# Patient Record
Sex: MALE | Race: WHITE | NOT HISPANIC OR LATINO | Employment: OTHER | URBAN - METROPOLITAN AREA
[De-identification: names, ages, dates, MRNs, and addresses within clinical notes are randomized per-mention and may not be internally consistent; named-entity substitution may affect disease eponyms.]

---

## 2017-02-22 ENCOUNTER — TRANSFERRED RECORDS (OUTPATIENT)
Dept: HEALTH INFORMATION MANAGEMENT | Facility: CLINIC | Age: 82
End: 2017-02-22

## 2017-02-28 ENCOUNTER — TRANSFERRED RECORDS (OUTPATIENT)
Dept: HEALTH INFORMATION MANAGEMENT | Facility: CLINIC | Age: 82
End: 2017-02-28

## 2017-03-06 ENCOUNTER — TRANSFERRED RECORDS (OUTPATIENT)
Dept: HEALTH INFORMATION MANAGEMENT | Facility: CLINIC | Age: 82
End: 2017-03-06

## 2017-03-08 ENCOUNTER — TRANSFERRED RECORDS (OUTPATIENT)
Dept: HEALTH INFORMATION MANAGEMENT | Facility: CLINIC | Age: 82
End: 2017-03-08

## 2017-03-16 ENCOUNTER — PRE VISIT (OUTPATIENT)
Dept: UROLOGY | Facility: CLINIC | Age: 82
End: 2017-03-16

## 2017-03-16 ENCOUNTER — SURGERY (OUTPATIENT)
Age: 82
End: 2017-03-16

## 2017-03-16 ENCOUNTER — INFUSION THERAPY VISIT (OUTPATIENT)
Dept: INFUSION THERAPY | Facility: CLINIC | Age: 82
End: 2017-03-16
Attending: INTERNAL MEDICINE
Payer: COMMERCIAL

## 2017-03-16 ENCOUNTER — HOSPITAL ENCOUNTER (OUTPATIENT)
Facility: CLINIC | Age: 82
Discharge: HOME | End: 2017-03-16
Attending: COLON & RECTAL SURGERY | Admitting: COLON & RECTAL SURGERY
Payer: COMMERCIAL

## 2017-03-16 VITALS — DIASTOLIC BLOOD PRESSURE: 73 MMHG | TEMPERATURE: 97.6 F | SYSTOLIC BLOOD PRESSURE: 116 MMHG

## 2017-03-16 VITALS
TEMPERATURE: 97.5 F | DIASTOLIC BLOOD PRESSURE: 76 MMHG | RESPIRATION RATE: 16 BRPM | OXYGEN SATURATION: 99 % | SYSTOLIC BLOOD PRESSURE: 125 MMHG | HEART RATE: 59 BPM

## 2017-03-16 DIAGNOSIS — N39.0 UTI (URINARY TRACT INFECTION): Primary | ICD-10-CM

## 2017-03-16 PROCEDURE — 40000882 ZZH CANCELLED SURGERY UP TO 46-60 MINS: Performed by: COLON & RECTAL SURGERY

## 2017-03-16 PROCEDURE — 27210218 ZZH KIT SHRLOCK 4FR SNGL LUM PICC

## 2017-03-16 PROCEDURE — 25000125 ZZHC RX 250: Performed by: INTERNAL MEDICINE

## 2017-03-16 PROCEDURE — 40000257 ZZH STATISTIC CONSULT NO CHARGE VASC ACCESS

## 2017-03-16 PROCEDURE — 96365 THER/PROPH/DIAG IV INF INIT: CPT

## 2017-03-16 PROCEDURE — 25000128 H RX IP 250 OP 636: Performed by: INTERNAL MEDICINE

## 2017-03-16 PROCEDURE — 36569 INSJ PICC 5 YR+ W/O IMAGING: CPT

## 2017-03-16 RX ADMIN — LIDOCAINE HYDROCHLORIDE 2 ML: 10 INJECTION, SOLUTION INFILTRATION; PERINEURAL at 13:17

## 2017-03-16 RX ADMIN — CEFTAZIDIME 2 G: 2 INJECTION, POWDER, FOR SOLUTION INTRAVENOUS at 14:47

## 2017-03-16 ASSESSMENT — PAIN SCALES - GENERAL: PAINLEVEL: NO PAIN (0)

## 2017-03-16 NOTE — PROGRESS NOTES
Infusion Nursing Note:  Paulo Jeffers presents today for Fortaz.    Patient seen by provider today: No   present during visit today: Not Applicable.    Note: Pt here for first dose of Fortaz and then [facility]      Intravenous Access:  PICC.    Treatment Conditions:  Not Applicable.      Post Infusion Assessment:  Patient tolerated infusion without incident.  Blood return noted pre and post infusion.  Site patent and intact, free from redness, edema or discomfort.  No evidence of extravasations.    Discharge Plan:   AVS to patient via MYCHART.    Patient discharged in stable condition accompanied by: self and wife.  Departure Mode: Ambulatory.    aRdha Velazquez RN

## 2017-03-16 NOTE — MR AVS SNAPSHOT
After Visit Summary   3/16/2017    Paulo Jeffers    MRN: [id number]           Patient Information     Date Of Birth          1935        Visit Information        Provider Department      3/16/2017 2:30 PM  INFUSION CHAIR 7 [facility]        Today's Diagnoses     UTI (urinary tract infection)    -  1       Follow-ups after your visit        Your next 10 appointments already scheduled     Apr 19, 2017  1:20 PM CDT   (Arrive by 1:05 PM)   New Patient Visit with Regina Cash MD   [facility] Prostate and Urologic Cancers ([facility] [employer])    [address] [place] [address]  4th [facility] [address] [phone]              Future tests that were ordered for you today     Open Standing Orders        Priority Remaining Interval Expires Ordered    XR Chest Port 1 View STAT 2/2 CONDITIONAL X 2  3/16/2017            Who to contact     If you have questions or need follow up information about today's clinic visit or your schedule please contact [facility] directly at [phone].  Normal or non-critical lab and imaging results will be communicated to you by [employer], letter or phone within 4 business days after the clinic has received the results. If you do not hear from us within 7 days, please contact the clinic through [employer] or phone. If you have a critical or abnormal lab result, we will notify you by phone as soon as possible.  Submit refill requests through [employer] or call your pharmacy and they will forward the refill request to us. Please allow 3 business days for your refill to be completed.          Additional Information About Your Visit        MyChart Information     [employer] gives you secure access to your electronic health record. If you see a primary care provider, you can also send messages to your care team and make appointments. If you have questions, please call your  primary care clinic.  If you do not have a primary care provider, please call [phone] and they will assist you.        Care EveryWhere ID     This is your Care EveryWhere ID. This could be used by other organizations to access your [place] medical records  [id number]        Your Vitals Were     Pulse Temperature Respirations Pulse Oximetry          59 97.5  F (36.4  C) (Oral) 16 99%         Blood Pressure from Last 3 Encounters:   03/16/17 125/76   03/16/17 116/73   09/21/12 138/96    Weight from Last 3 Encounters:   09/21/12 93 kg (205 lb)   06/30/12 93.9 kg (207 lb)   06/20/12 95.9 kg (211 lb 6.7 oz)              Today, you had the following     No orders found for display         Today's Medication Changes      Notice     This visit is during an admission. Changes to the med list made in this visit will be reflected in the After Visit Summary of the admission.             Primary Care Provider Office Phone # Fax #    Denny Roque [phone]       [place] [address]        Thank you!     Thank you for choosing [facility]  for your care. Our goal is always to provide you with excellent care. Hearing back from our patients is one way we can continue to improve our services. Please take a few minutes to complete the written survey that you may receive in the mail after your visit with us. Thank you!             Your Updated Medication List - Protect others around you: Learn how to safely use, store and throw away your medicines at www.disposemymeds.org.      Notice     This visit is during an admission. Changes to the med list made in this visit will be reflected in the After Visit Summary of the admission.

## 2017-03-16 NOTE — PROGRESS NOTES
PICC teaching regarding line, care, and possible complication completed by Sonia Oliveira RN. Consent obtained.

## 2017-03-20 LAB
ALBUMIN UR-MCNC: NEGATIVE MG/DL
APPEARANCE UR: CLEAR
AST SERPL W P-5'-P-CCNC: 25 U/L (ref 0–45)
BASOPHILS # BLD AUTO: 0.1 10E9/L (ref 0–0.2)
BASOPHILS NFR BLD AUTO: 0.5 %
BILIRUB UR QL STRIP: NEGATIVE
CK SERPL-CCNC: 99 U/L (ref 30–300)
COLOR UR AUTO: NORMAL
DIFFERENTIAL METHOD BLD: ABNORMAL
EOSINOPHIL # BLD AUTO: 0.2 10E9/L (ref 0–0.7)
EOSINOPHIL NFR BLD AUTO: 2.3 %
ERYTHROCYTE [DISTWIDTH] IN BLOOD BY AUTOMATED COUNT: 12.1 % (ref 10–15)
GLUCOSE UR STRIP-MCNC: NEGATIVE MG/DL
HCT VFR BLD AUTO: 33 % (ref 40–53)
HGB BLD-MCNC: 11.4 G/DL (ref 13.3–17.7)
HGB UR QL STRIP: NEGATIVE
IMM GRANULOCYTES # BLD: 0.1 10E9/L (ref 0–0.4)
IMM GRANULOCYTES NFR BLD: 1 %
KETONES UR STRIP-MCNC: NEGATIVE MG/DL
LEUKOCYTE ESTERASE UR QL STRIP: NEGATIVE
LYMPHOCYTES # BLD AUTO: 1.2 10E9/L (ref 0.8–5.3)
LYMPHOCYTES NFR BLD AUTO: 12 %
MCH RBC QN AUTO: 32.4 PG (ref 26.5–33)
MCHC RBC AUTO-ENTMCNC: 34.5 G/DL (ref 31.5–36.5)
MCV RBC AUTO: 94 FL (ref 78–100)
MONOCYTES # BLD AUTO: 0.5 10E9/L (ref 0–1.3)
MONOCYTES NFR BLD AUTO: 5 %
NEUTROPHILS # BLD AUTO: 7.7 10E9/L (ref 1.6–8.3)
NEUTROPHILS NFR BLD AUTO: 79.2 %
NITRATE UR QL: NEGATIVE
NRBC # BLD AUTO: 0 10*3/UL
NRBC BLD AUTO-RTO: 0 /100
PH UR STRIP: 6.5 PH (ref 5–7)
PLATELET # BLD AUTO: 270 10E9/L (ref 150–450)
RBC # BLD AUTO: 3.52 10E12/L (ref 4.4–5.9)
RBC #/AREA URNS AUTO: 0 /HPF (ref 0–2)
SP GR UR STRIP: 1 (ref 1–1.03)
URN SPEC COLLECT METH UR: NORMAL
UROBILINOGEN UR STRIP-MCNC: NORMAL MG/DL (ref 0–2)
WBC # BLD AUTO: 9.7 10E9/L (ref 4–11)
WBC #/AREA URNS AUTO: 1 /HPF (ref 0–2)

## 2017-03-20 PROCEDURE — 82550 ASSAY OF CK (CPK): CPT | Performed by: INTERNAL MEDICINE

## 2017-03-20 PROCEDURE — 84450 TRANSFERASE (AST) (SGOT): CPT | Performed by: INTERNAL MEDICINE

## 2017-03-20 PROCEDURE — 85025 COMPLETE CBC W/AUTO DIFF WBC: CPT | Performed by: INTERNAL MEDICINE

## 2017-03-20 PROCEDURE — 82565 ASSAY OF CREATININE: CPT | Performed by: INTERNAL MEDICINE

## 2017-03-20 PROCEDURE — 87086 URINE CULTURE/COLONY COUNT: CPT | Performed by: INTERNAL MEDICINE

## 2017-03-20 PROCEDURE — 81001 URINALYSIS AUTO W/SCOPE: CPT | Performed by: INTERNAL MEDICINE

## 2017-03-21 ENCOUNTER — HOSPITAL ENCOUNTER (OUTPATIENT)
Facility: CLINIC | Age: 82
Discharge: HOME | End: 2017-03-21
Attending: PATHOLOGY | Admitting: PATHOLOGY
Payer: COMMERCIAL

## 2017-03-21 ENCOUNTER — SURGERY (OUTPATIENT)
Age: 82
End: 2017-03-21

## 2017-03-21 VITALS
RESPIRATION RATE: 30 BRPM | DIASTOLIC BLOOD PRESSURE: 82 MMHG | OXYGEN SATURATION: 96 % | SYSTOLIC BLOOD PRESSURE: 143 MMHG

## 2017-03-21 LAB
BACTERIA SPEC CULT: NO GROWTH
BASOPHILS # BLD AUTO: 0 10E9/L (ref 0–0.2)
BASOPHILS NFR BLD AUTO: 0.5 %
DIFFERENTIAL METHOD BLD: ABNORMAL
EOSINOPHIL # BLD AUTO: 0.3 10E9/L (ref 0–0.7)
EOSINOPHIL NFR BLD AUTO: 4.4 %
ERYTHROCYTE [DISTWIDTH] IN BLOOD BY AUTOMATED COUNT: 12.1 % (ref 10–15)
HCT VFR BLD AUTO: 31.4 % (ref 40–53)
HGB BLD-MCNC: 11.4 G/DL (ref 13.3–17.7)
IMM GRANULOCYTES # BLD: 0.1 10E9/L (ref 0–0.4)
IMM GRANULOCYTES NFR BLD: 1.8 %
LYMPHOCYTES # BLD AUTO: 1.1 10E9/L (ref 0.8–5.3)
LYMPHOCYTES NFR BLD AUTO: 18 %
Lab: NORMAL
MCH RBC QN AUTO: 33.7 PG (ref 26.5–33)
MCHC RBC AUTO-ENTMCNC: 36.3 G/DL (ref 31.5–36.5)
MCV RBC AUTO: 93 FL (ref 78–100)
MICRO REPORT STATUS: NORMAL
MONOCYTES # BLD AUTO: 0.5 10E9/L (ref 0–1.3)
MONOCYTES NFR BLD AUTO: 8 %
NEUTROPHILS # BLD AUTO: 4.1 10E9/L (ref 1.6–8.3)
NEUTROPHILS NFR BLD AUTO: 67.3 %
NRBC # BLD AUTO: 0 10*3/UL
NRBC BLD AUTO-RTO: 0 /100
PLATELET # BLD AUTO: 206 10E9/L (ref 150–450)
RBC # BLD AUTO: 3.38 10E12/L (ref 4.4–5.9)
RETICS # AUTO: 49.3 10E9/L (ref 25–95)
RETICS/RBC NFR AUTO: 1.5 % (ref 0.5–2)
SPECIMEN SOURCE: NORMAL
WBC # BLD AUTO: 6.1 10E9/L (ref 4–11)

## 2017-03-21 PROCEDURE — 40000795 ZZHCL STATISTIC DNA PROCESS AND HOLD: Performed by: PATHOLOGY

## 2017-03-21 PROCEDURE — 88313 SPECIAL STAINS GROUP 2: CPT | Mod: 26 | Performed by: INTERNAL MEDICINE

## 2017-03-21 PROCEDURE — 40001005 ZZHCL STATISTIC FLOW >15 ABY TC 88189: Performed by: INTERNAL MEDICINE

## 2017-03-21 PROCEDURE — 40000847 ZZHCL STATISTIC MORPHOLOGY W/INTERP HISTOLOGY TC 85060: Performed by: INTERNAL MEDICINE

## 2017-03-21 PROCEDURE — 25000128 H RX IP 250 OP 636: Performed by: PATHOLOGY

## 2017-03-21 PROCEDURE — 88313 SPECIAL STAINS GROUP 2: CPT | Performed by: INTERNAL MEDICINE

## 2017-03-21 PROCEDURE — 38221 DX BONE MARROW BIOPSIES: CPT | Performed by: INTERNAL MEDICINE

## 2017-03-21 PROCEDURE — 88311 DECALCIFY TISSUE: CPT | Performed by: INTERNAL MEDICINE

## 2017-03-21 PROCEDURE — 85045 AUTOMATED RETICULOCYTE COUNT: CPT | Performed by: PATHOLOGY

## 2017-03-21 PROCEDURE — 88264 CHROMOSOME ANALYSIS 20-25: CPT | Performed by: INTERNAL MEDICINE

## 2017-03-21 PROCEDURE — 88305 TISSUE EXAM BY PATHOLOGIST: CPT | Performed by: INTERNAL MEDICINE

## 2017-03-21 PROCEDURE — 88184 FLOWCYTOMETRY/ TC 1 MARKER: CPT | Performed by: INTERNAL MEDICINE

## 2017-03-21 PROCEDURE — 85060 BLOOD SMEAR INTERPRETATION: CPT | Performed by: INTERNAL MEDICINE

## 2017-03-21 PROCEDURE — 88182 CELL MARKER STUDY: CPT | Performed by: INTERNAL MEDICINE

## 2017-03-21 PROCEDURE — 88185 FLOWCYTOMETRY/TC ADD-ON: CPT | Performed by: INTERNAL MEDICINE

## 2017-03-21 PROCEDURE — 00000159 ZZHCL STATISTIC H-SEND OUTS PREP: Performed by: INTERNAL MEDICINE

## 2017-03-21 PROCEDURE — 40000424 ZZHCL STATISTIC BONE MARROW CORE PERF TC 38221: Performed by: INTERNAL MEDICINE

## 2017-03-21 PROCEDURE — 99152 MOD SED SAME PHYS/QHP 5/>YRS: CPT | Performed by: PATHOLOGY

## 2017-03-21 PROCEDURE — 88237 TISSUE CULTURE BONE MARROW: CPT | Performed by: INTERNAL MEDICINE

## 2017-03-21 PROCEDURE — 85025 COMPLETE CBC W/AUTO DIFF WBC: CPT | Performed by: PATHOLOGY

## 2017-03-21 PROCEDURE — 88305 TISSUE EXAM BY PATHOLOGIST: CPT | Mod: 26 | Performed by: INTERNAL MEDICINE

## 2017-03-21 PROCEDURE — 40000948 ZZHCL STATISTIC BONE MARROW ASP TC 85097: Performed by: INTERNAL MEDICINE

## 2017-03-21 PROCEDURE — G0364 BONE MARROW ASPIRATE &BIOPSY: HCPCS

## 2017-03-21 PROCEDURE — 88311 DECALCIFY TISSUE: CPT | Mod: 26 | Performed by: INTERNAL MEDICINE

## 2017-03-21 PROCEDURE — 00000058 ZZHCL STATISTIC BONE MARROW ASP PERF TC 38220: Performed by: INTERNAL MEDICINE

## 2017-03-21 PROCEDURE — 85097 BONE MARROW INTERPRETATION: CPT | Performed by: INTERNAL MEDICINE

## 2017-03-21 PROCEDURE — 88280 CHROMOSOME KARYOTYPE STUDY: CPT | Performed by: INTERNAL MEDICINE

## 2017-03-21 PROCEDURE — 38221 DX BONE MARROW BIOPSIES: CPT | Performed by: PATHOLOGY

## 2017-03-21 RX ORDER — TIMOLOL MALEATE 5 MG/ML
1 SOLUTION/ DROPS OPHTHALMIC 2 TIMES DAILY
COMMUNITY

## 2017-03-21 RX ORDER — OMEGA-3-ACID ETHYL ESTERS 1 G/1
2 CAPSULE, LIQUID FILLED ORAL 2 TIMES DAILY
COMMUNITY

## 2017-03-21 RX ORDER — CYCLOSPORINE 0.5 MG/ML
1 EMULSION OPHTHALMIC 2 TIMES DAILY
COMMUNITY

## 2017-03-21 RX ORDER — MULTIPLE VITAMINS W/ MINERALS TAB 9MG-400MCG
1 TAB ORAL DAILY
COMMUNITY

## 2017-03-21 RX ORDER — FLUMAZENIL 0.1 MG/ML
0.2 INJECTION, SOLUTION INTRAVENOUS
Status: DISCONTINUED | OUTPATIENT
Start: 2017-03-21 | End: 2017-03-21 | Stop reason: HOSPADM

## 2017-03-21 RX ADMIN — MIDAZOLAM HYDROCHLORIDE 0.5 MG: 1 INJECTION, SOLUTION INTRAMUSCULAR; INTRAVENOUS at 11:42

## 2017-03-21 RX ADMIN — MIDAZOLAM HYDROCHLORIDE 1 MG: 1 INJECTION, SOLUTION INTRAMUSCULAR; INTRAVENOUS at 11:38

## 2017-03-21 NOTE — PROCEDURES
The patient with recently diagnosed bladder amyloidosis was positively identified and informed consent was obtained (see the completed Affirmation of Consent for Bone Marrow Aspiration and/or Biopsy Procedure(s) form in the patient's chart). The patient was placed in the prone position and the bony landmarks of the pelvis were identified. Medical staff reconfirmed the patient's name, date of birth and procedure. The skin over the posterior iliac crest was scrubbed and draped in a sterile fashion. The local area of the procedure was anesthetized with a total of 10 mL of 1% Lidocaine and a small incision was made.  The patient did receive conscious sedation.    Trephine bone marrow core(s) was/were obtained from the left posterior iliac crest. Bone marrow aspirate was obtained from the left posterior iliac crest for: morphology with possible immunophenotyping and/or cytogenetics and molecular diagnostics    Direct pressure was applied to the biopsy site with sterile gauze. The biopsy site was cleaned with alcohol and a sterile dressing was placed over the biopsy incision using a pressure bandage. The patient was then placed in the supine position to maintain pressure on the biopsy site. Post-procedure wound care instructions, including routine dressing instructions and analgesia, were given to the patient. The procedure was completed without complication.

## 2017-03-21 NOTE — PROGRESS NOTES
[facility]  House Physician Consult Note     1100 - Tues 21 Mar 2017   Outpatient Procedures / Endoscopy       [facility] Procedure Note        Sedation:      Performed by: Rodney Javier  Authorized by: Rodney Javier    Pre-Procedure Assessment done at 1100.    Expected Level:  Moderate Sedation    Indication:  Sedation is required to allow for Bone Marrow Biopsy     Consent obtained from patient after discussing the risks, benefits and alternatives.    PO Intake:  Appropriately NPO for procedure    ASA Class:  Class 2 - MILD SYSTEMIC DISEASE, NO ACUTE PROBLEMS, NO FUNCTIONAL LIMITATIONS.    Mallampati:  Grade 1:  Soft palate, uvula, tonsillar pillars, and posterior pharyngeal wall visible    Lungs: Lungs Clear with good breath sounds bilaterally.     Heart: Normal heart sounds and rate    History and physical reviewed and no updates needed. I have reviewed the lab findings, diagnostic data, medications, and the plan for sedation. I have determined this patient to be an appropriate candidate for the planned sedation and procedure and have reassessed the patient IMMEDIATELY PRIOR to sedation and procedure      Sedation Post Procedure Summary:    Prior to the start of the procedure and with procedural staff participation, I verbally confirmed the patient s identity using two indicators, relevant allergies, that the procedure was appropriate and matched the consent or emergent situation, and that the correct equipment/implants were available. Immediately prior to starting the procedure I conducted the Time Out with the procedural staff and re-confirmed the patient s name, procedure, and site/side. (The Joint Commission universal protocol was followed.)    --> No (Not Applicable for House MD as Pathologist does Bone Marrow Bx)       Sedatives: Fentanyl and Midazolam (Versed)    Vital signs, airway, and pulse oximetry were monitored and remained stable throughout the  procedure and sedation was maintained until the procedure was complete.  The patient was monitored by staff until sedation discharge criteria were met.    Patient tolerance: Patient tolerated the procedure well with no immediate complications.    Time of sedation in minutes:  20 minutes from beginning to end of physician one to one monitoring.      CHILO Javier MD/  15 min   [facility] Physician / [phone]    __________________________________            - 81M  - Full Code  - fentanyl --> N/Vomiting  - codeine --> N/Vomiting            PROBLEMS   ACUTE/ CHRONIC       -   -   - Amyloidosis   -   -   -   -   -   -   -   -   -   -   -   - htn   -   -   -   -   - Atrial fibrillation   - DVT   -   -   -   - BPH    - UTI   -   - Colon polyps  -   -   -   -   -   -   - lumbar Spinal stenosis     - neurogenic claudication  -   -   -   - chronic serous otitis media  -   -   -   -   -   -   -   -   -   -   -   -   -   -   -      MEDICATIONS  (NEW/CHANGES)       -   -   -   - Multivitamin/minerals   - LACTOBACILLUS   -   - GABAPENTIN   -   - omega-3 acid ethyl esters (LOVAZA) 1 G  - methylcellulose (CITRUCEL) 500   - OXYBUTYNIN   -   -   -   - AMIODARONE 200   - AmLODIPine (NORVASC 2.5 bid   - LOSARTAN   - Spironolactone (ALDACTONE  -   - Rivaroxaban (XARELTO   -   -   -   -   - dutasteride (AVODART) 0.5   -   -   -   -   -   -   -   -   -   -   -   -   -   -   -   - cycloSPORINE (RESTASIS) 0.05 % ophthalmic emulsion  - timolol (TIMOPTIC) 0.5 % ophthalmic solution  -   -   - Tafluprost (ZIOPTAN) 0.0015 %   -   -   -      # Procedural Physician Assessment For Moderate/Conscious Sedation      Procedure- Bone Marrow Bx  Indication - Amyloidosis      A/P -   - patient assessment completed immediately prior to procedure by me   - vital signs have been reviewed   - airway, lungs, and heart have been assessed   - physical status classification is P2**     - (P2 -  A patient with mild systemic disease)   - risks, benefits, and  alternatives to moderate sedation explained and consent obtained   - approved for moderate/conscious sedation for Bone Marrow Bx    --> proceed with planned sedation     A Yaya SWAIN/  15 min   [facility] Physician / [phone]    _______________________________________      History -   - NPO > 6 hrs    - no previous anesthesia/sedation problems -     - (+) vom after fentanyl, also with codeine        - no heart disease    - no valve replacement disease      - no lung disease    - no sleep apnea    - no smoking or significant exposure to tobacco smoke in last year       - no diabetes    - no insulin use      - (+)no bleeding tendency/disorder     -    - no aspirin   - no other anti-coagulant       - no recent infection      - (+) glaucoma -   - took topical meds this am        Exam -   Gen  - looks good - NAD    - alert, cooperative    - nl speech / Lang   - breathing easily - nl rate/ effort/ depth    - color-good, warm, dry      VS  - 132/80 - 58    - 97% - RA - 14 - easy - nl depth   - afebrile     ENT - Mallampati Class I*     - (MC-I - Faucial pillars, soft palate, and uvula are visible)   - able to open mouth    - able to stick tongue out     Lungs - nl - clear ausc bilat equal posterior base     Heart  - nl - RRR, nl s1s2                       [facility] Policy and Procedure Manual -    -System Policy--Code: S:PC-2012 -      -Sedation and Analgesia for Procedures    *Mallampati Classification   Class  Definintion   I   Faucial pillars, soft palate, and uvula are visible   II   Faucial pillars and soft palate may be seen, but uvula is masked by the base of the tongue   III   Only soft palate is visible. Intubation is predicted to be difficult   IV   Soft palate not visible. Intubation predicted to be difficult     - The Mallampati classification is based on the finding that visualization of the glottis is impaired when the base of the tongue is disproportionately large   - Assessment is made with the patient  "sitting upright, with head in neutral position, the mouth open as wide as possible, and the tongue protruded maximally   - The modified classification includes four categories/classes above       **ASA Physical Classification System  Class  Definintion   P1  A normal healthy patient   P2  A patient with mild systemic disease   P3  A patient with severe systemic disease   P4  A patient with severe systemic disease that is a constant threat to life   P5  A moribund patient who is not expected to survive without the operation   P6  A declared brain-dead patient whose organs are being removed for donor purposes     (http://www.asahq.org/clinical/physicalstatus.htm; March 9, 2006)            APPENDICES -   - Keystrokes for convenience   - Key to symbols, abbreviations, and format conventions    Keystrokes for convenience (p 1 / 3) -     - from on-line summary article by Sharan Oconnor-- + \"all-around computer geek\"          Working with words instead of letters -       - using L or R arrow  (<- / ->), Backspace (<--), or Delete keys to select single characters     - add Control key (option key for [employer] users) to apply to entire words or phrases          - C/<-   - Ctrl + L arrow  - move cursor to beginning of previous word   - C/->   - Ctrl + R arrow - move cursor to beginning of next word     - C/<--  - Ctrl + Backspace  - delete previous word   - C/delete  - Ctrl + Delete  - delete next word     - C/up   - Ctrl + up arrow  - select text from cursor to beginning current paragraph or text entry field   - C/dn   - Ctrl + dn arrow  - select text from cursor to end current paragraph or text entry field               Moving the Cursor -     - Home   -   - beginning of current line   - End   -   - end of current line   - C/Home  - Ctrl + Home  - top of text entry field   - C/End   - Ctrl + End  - bottom of text entry field   - pg up   - Page Up  - up a frame   - pg dn   - Page Down  - down a frame " "                    Keystrokes for convenience (p 2 / 3)    Selecting text -      - ^/end   - shift + end   - select to end of current line   - ^/home  - shift + home  - select to beginning of current line     - C/Lclk  - ctrl* + L click   - select individual smartphrases/entries/lines from  list   - ^/Lclk   - shift + L click   - select all smartphrases/entries/lines from initial selection to next     - ^<-   - shift + L arrow   - select characters one at a time   - ^->   - shift + R arrow   - select characters one at a time     - ^up    - shift  + up arrow   - select line above   - ^dn   - shift + dn arrow   - select line below     - ^C<-  - shift + ctrl + L arrow  - select words   - ^C->   - shift + ctrl + R arrow  - select words     - ^up  - shift + up arrow   - select paragraph above cursor   - ^dn   - shift + dn arrow   - select paragraph below  cursor      - ^Home  - shift + Home   - select text between cursor and beginning of line   - ^End   - shift + End   - select text between cursor and end of line     - ^C/Home - shift + ctrl + Home   - select text between cursor and beginning of next text entry field -------  - ^C/End   - shift + ctrl + End   - select text between cursor and end of next text entry field     - ^pg dn   - shift + page down   - select frame of text below cursor   - ^pg up   - shift + page up   - select frame of text above cursor     - C/A  - Ctrl + A    - select all text         Combine in sequence -     - ^End      - select to end line   - ^dn     - select line below   - (not need \"delete\" first, just start typing)         Editing -     - C/C   - Ctrl + C*   - copy selected text   - C/Insert  - Ctrl + Insert   - copy selected text     - C/X  -Ctrl + X*    - cut/delete selected text   - ^Del  - shift + Delete   - cut/delete selected text     - C/V   - Ctrl + V*   - paste   - ^Insert   - shift + Insert   - paste     - C/Z   - Ctrl + Z*   - undo   - C/Y   - Ctrl + Y*   - redo     - C/Z   - " Ctrl + Z*   - go back 1 step   - A/<--  - Alt + Backspace   - go back 1 step (same as Ctrl + Z)            Formatting -     - C/B  - Ctrl + B*   - Bold   - C/I  - Ctrl + I*    - Italics   - C/U  - Ctrl .+ U*  - Underline       Keystrokes for convenience (p 3 / 3)     Functions -     - C/F   - Ctrl + F    - Find   - F3   - F3    - Find Next   - ^F3  - shift + F3   - Find Previous     - C/O  - Ctrl + O   - Open   - C/S   - Ctrl + S   - Save   - C/N   - Ctrl + N   - New doc   - C/P   - Ctrl + P   - Print     - A   - Alt**    - activate application's menu bar   - A/F   - Alt + F    - open File menu   - A/E  - Alt + E    - open Edit menu   - A/V  - Alt + V    - open View menu     ______________________________                          Key to symbols, abbreviations, and format conventions - [ordered by sense / meaning]     Symbol (Description)  Meaning / interpretation     #  (number/ hashtag)   - problem - summary which does not need editing if copied forward     -  (dash)   - data - s/sx (changes) found/observed at time pt seen     --  (double dash/ hyphen)  - Assessment/ Interpretation/ Impression - of observations in context of problem -       - - improved, worsened no change   -->  (arrow)    - Plan - action to be done in response to assessment -       - - either that day either or after discharge       --->>  (double arrow)   - cont same plan (eg --->> lisinopril  =  Cont lisinopril )        ([place])   - degrees    [   ] (brackets)  - [author's (my own) comments/thoughts/additions/assumptions]      0'4 (apostrophe)   - instead of period - not stop highlighting entire line as a single unit - means 0.4           Abbrv'n    Abbreviation    - Time -   -    - date-time (month implied)  - (-2d)  - 2d ago   - d  - day(s)   - m - min(s)   - minute(s)   - h  - hr(s)   - hour(s)   - mo  - month(s)       - c  - with   - w /   - with     - s  - without   - w /o  - without     - x  - except     - p  - after   - a  -  before     - c/w  - consistent with   - c/c  - compare/contrast - ie differentiate from       - sx  - symptomatic   - asx  - asymptomatic       - sx  - symptoms   - s/sx  - signs/symptoms       - dx  - diagnosis   - dz  - disease   - dz  - dizzy   - ez  - easy       - tx  - treatment (or transplant)   - Tx  - Transplant (eg. LRD RTx - Living Related Donor Renal Transplant)     - px  - prophylaxis   - pphx  - prophylaxis       - d/c  - discharge (eg from hospital or from eye)       - cx  - culture (or canceled)   - cx  - cancelled       - fx  - fracture   - rxn  - reaction   - fxn  - function       - yest  - yesterday - (or yday)   - partha  - tomorrow       - nl  - normal   - abnl  - abnormal     - incr'd  -   - decr'd -     - hr (r)  - hyper-   - ho (o) - hypo-      - abx  - antibiotics       - cp  - chest pain   - sob  - shortness of breath       - dz/ lh - dizziness / lightheadedness   - HA - HeadAche (ha)   - H/N  - Head / Neck (h/n)       - f/c  - fevers/chills   - n/v  - nausea/vomiting       - cp  - chest pain/ pressure   - sob  - shortness of breath       - eg  - for example (e.g.)   - ie  - in other words (i.e.)       - 2   - secondary to (caused by, because of)   - 2/2  - secondary to (caused by, because of)     - dtr  - daughter   - mo  - mother   - fa  - father     - *o/w  - otherwise   - usu - usual     - * note: capital L used for visual clarity/emphasis where otherwise lower case is strictly correct -   - - eg in generic names drugs Labetalol or mL milliLiters        - [Cap]* - Capital Letter  - not need capital - only capital for clarity/emphasis in presentation here   - *Ctrl  - Control   - **Alt - Alternate                             Anatomic Relationships -       - Lat - lateral (Lateral)        - prox  - proximal       Laterality (side) -   - bLat  - biLateral  - both sides   - iLat  - ipsiLateral  - same side   - cLat  - contraLateral  - opposite side       - L*  - Left*   - R  - Right   - B  -  Bilateral   - (ilan)* - (any anatomical structure) + * (asterix) => Bilateral implied unless side / laterality specified    -- eg. Calf* => calf (B)    -- eg. Calf -  R .... L .... => different findings for R or L specified         - * note: capital L used for visual clarity/emphasis where otherwise lower case is strictly correct -   - - eg in generic names drugs Labetalol or mL milliLiters       - eg -     - UE  - Upper Extremity   - LE - Lower Extremity     - LUE   - Left Upper Extremity   - LLE  - Right Lower Extremity     - RUE  - Right Upper Extremity   - RLE  - Right Lower Extremity     - BU/LE  - Bilat Upper/Lower Extremities       __________________________________________

## 2017-03-22 LAB
COPATH REPORT: NORMAL
CREAT SERPL-MCNC: 1.6 MG/DL (ref 0.66–1.25)
GFR SERPL CREATININE-BSD FRML MDRD: 42 ML/MIN/1.7M2

## 2017-03-23 LAB
COPATH REPORT: NORMAL
COPATH REPORT: NORMAL

## 2017-04-03 LAB — COPATH REPORT: NORMAL

## 2017-07-18 ENCOUNTER — OFFICE VISIT (OUTPATIENT)
Dept: VASCULAR SURGERY | Facility: CLINIC | Age: 82
End: 2017-07-18
Payer: MEDICARE

## 2017-07-18 DIAGNOSIS — Z53.9 ERRONEOUS ENCOUNTER--DISREGARD: Primary | ICD-10-CM

## 2017-07-18 PROCEDURE — 99203 OFFICE O/P NEW LOW 30 MIN: CPT | Performed by: SURGERY

## 2017-07-18 NOTE — MR AVS SNAPSHOT
After Visit Summary   7/18/2017    Paulo Jeffers    MRN: [id number]           Patient Information     Date Of Birth          1935        Visit Information        Provider Department      7/18/2017 11:45 AM Denny Antonio MD [facility]      Today's Diagnoses     ERRONEOUS ENCOUNTER--DISREGARD    -  1       Follow-ups after your visit        Your next 10 appointments already scheduled     Apr 02, 2018  3:15 PM CDT   Return Visit with Denny Antonio MD   [facility] ([facility])    [address] [facility] [address] [phone]              Who to contact     If you have questions or need follow up information about today's clinic visit or your schedule please contact [facility] directly at [phone].  Normal or non-critical lab and imaging results will be communicated to you by MyChart, letter or phone within 4 business days after the clinic has received the results. If you do not hear from us within 7 days, please contact the clinic through [employer] or phone. If you have a critical or abnormal lab result, we will notify you by phone as soon as possible.  Submit refill requests through [employer] or call your pharmacy and they will forward the refill request to us. Please allow 3 business days for your refill to be completed.          Additional Information About Your Visit        MyChart Information     [employer] gives you secure access to your electronic health record. If you see a primary care provider, you can also send messages to your care team and make appointments. If you have questions, please call your primary care clinic.  If you do not have a primary care provider, please call [phone] and they will assist you.        Care EveryWhere ID     This is your Care EveryWhere ID. This could be used by other organizations to access your  [place] medical records  [id number]         Blood Pressure from Last 3 Encounters:   03/21/17 143/82   03/16/17 125/76   03/16/17 116/73    Weight from Last 3 Encounters:   09/21/12 93 kg (205 lb)   06/30/12 93.9 kg (207 lb)   06/20/12 95.9 kg (211 lb 6.7 oz)              Today, you had the following     No orders found for display       Primary Care Provider Office Phone # Fax #    Denny Roque [phone]       [address] [address]        Equal Access to Services     [facility]: Hadii jesus Vincent, waaxda luqadaha, qaybta kaalmada fred, saba thomson. So [facility] [place] [phone].    ATENCIÓN: Si habla español, tiene a carr disposición servicios gratuitos de asistencia lingüística. [facility] [phone].    We comply with applicable federal civil rights laws and [place] laws. We do not discriminate on the basis of race, color, national origin, age, disability, sex, sexual orientation, or gender identity.            Thank you!     Thank you for choosing [facility]  for your care. Our goal is always to provide you with excellent care. Hearing back from our patients is one way we can continue to improve our services. Please take a few minutes to complete the written survey that you may receive in the mail after your visit with us. Thank you!             Your Updated Medication List - Protect others around you: Learn how to safely use, store and throw away your medicines at www.disposemymeds.org.          This list is accurate as of 7/18/17 11:59 PM.  Always use your most recent med list.                   Brand Name Dispense Instructions for use Diagnosis    ALDACTONE PO           AMIODARONE HCL PO      Take 200 mg by mouth daily        cycloSPORINE 0.05 % ophthalmic emulsion    RESTASIS     1 drop 2 times daily        dutasteride 0.5 MG capsule    AVODART     Take 0.5 mg by mouth daily.         GABAPENTIN PO           LACTOBACILLUS PO           LOSARTAN POTASSIUM PO           methylcellulose 500 MG Tabs tablet    CITRUCEL     Take 500 mg by mouth daily        Multi-vitamin Tabs tablet      Take 1 tablet by mouth daily        NORVASC PO      Take 2.5 mg by mouth 2 times daily        omega-3 acid ethyl esters 1 G capsule    Lovaza     Take 2 g by mouth 2 times daily        OXYBUTYNIN CHLORIDE PO           timolol 0.5 % ophthalmic solution    TIMOPTIC     1 drop 2 times daily        XARELTO PO           ZIOPTAN 0.0015 % Soln   Generic drug:  Tafluprost      1 drop At Bedtime

## 2017-07-18 NOTE — PROGRESS NOTES
[facility] Clinic Note    Paulo Jeffers   1935    Mr. Jeffers was last seen in our clinic about three years ago regarding bilateral ostomy swelling which is controlled with compression hose.  In 2017 he underwent surgery to correct a ruptured right patellar tendon and since that time he has had some more significant swelling of his right leg.  He presents for questions regarding management of his edema.    He has been walking in an effort to rehabilitate himself.  He has been undergoing physical therapy and is regaining the range of motion in his right leg as well.    Previous ultrasounds have revealed a comments on the right common femoral vein with mild incompetence of the right femoral and popliteal veins.  There was a short segment of incompetence in the right great saphenous vein at the mid thigh level.    On physical exam the incision in his distal anterior right thigh is healing well.  There is edema of his thigh extending down to just below the knee at the upper level of the knee-high compression stocking.  The edema of his right ankle and leg is well controlled with the stocking.  I do not appreciate significant varicose veins of his right leg.    There is no swelling of the left leg of significance.    Impression exacerbation of right lower extremity edema status post surgical repair of her quadriceps tendon rupture.  He states the swelling is improving slowly and asked questions about the use of thigh-high versus knee-high compression.  Specifically, he is concerned that wearing the knee-high compression will exacerbate swelling of his right knee.  I do not believe this is the case.  Obviously, wearing a thigh-high compression stocking will help control the edema of the right knee more effectively, but I do not believe that wearing the knee-high compression will cause the right distal thigh and knee to swell more significantly.  Either stocking, in my opinion, is satisfactory.    He also  had questions regarding use of a Flexitouch sequential compression device.  Given the limited nature of his swelling and the fact that it is postoperative, I would not invest in a Flexitouch system at this time.    He will return to see me in the future if he has concerns or questions.    BRITTANY Antonio M.D.    Please send a copy of this dictation to Dr. Denny Roque

## 2018-04-02 ENCOUNTER — OFFICE VISIT (OUTPATIENT)
Dept: VASCULAR SURGERY | Facility: CLINIC | Age: 83
End: 2018-04-02
Payer: COMMERCIAL

## 2018-04-02 DIAGNOSIS — Z53.9 ERRONEOUS ENCOUNTER--DISREGARD: Primary | ICD-10-CM

## 2018-04-02 PROCEDURE — 99213 OFFICE O/P EST LOW 20 MIN: CPT | Performed by: SURGERY

## 2018-04-02 NOTE — PROGRESS NOTES
[facility] Progress Note    Paulo Jeffers returns in follow-up of bilateral lower extremity edema.  I last saw him on 7/18/2017 when he had swelling of his right greater than left lower extremity following right patellar tendon repair.  At that time, most of the swelling was in his right thigh extending down to just below his knee.    He has history is significant for left lower extremity deep vein thrombosis several years ago.  He was on anticoagulation for a period of time    He is now complaining of swelling involving the legs from the mid legs down to the feet.  He states this is been occurring over the last 3-6 months.  He admits that he had an episode of what sounds like congestive heart failure apparently related to a reaction to a cardiac medication he was taking.  He discontinued the medication and has slowly improved.  He was on home oxygen for period of time and had undergo significant rehabilitation.  He is no longer using home oxygen and is walking between one half and 1-1/2 miles daily.  He is not feeling short of breath and is not suffering chest pain.    He did share that he was diagnosed with amyloidosis and was apparently told that this involved his kidneys and, possibly, his heart.  He was not told that he had any cardiac dysfunction related to the amyloidosis, however.    Physical exam  General: Pleasant gentleman in no acute distress.  Extremities: He has edema from his knees to the proximal feet, left greater than right.  He has 1-2+ edema on the right and 2+ edema on the left.  There is no lipodermatosclerosis nor are there any significant venous stasis changes.  I do not appreciate significant varicose veins.  There is significant edema of the fat pad areas on the lateral ankles bilaterally, left greater than right.  There is pitting edema of his left pretibial area, worse on the left than the right.  He has 2+ dorsalis pedis and 3+ posterior tibial pulses bilaterally.    Duplex ultrasound  of his lower extremity veins dated 7/1/2014 revealed bilateral common femoral vein incompetence.  In his right lower extremity, he had femoral and popliteal vein reflux measuring less than 1 second in duration.  His right great saphenous vein was in competent at the mid thigh but otherwise was competent.  The right small saphenous vein was competent.  The left great saphenous vein in the proximal thigh was incompetent but otherwise competent.  The accessory saphenous vein and small saphenous veins were competent.    Impression  Ongoing bilateral lower extremity swelling, now worse on the left leg than the right.  We discussed the multiple potential causes of swelling including congestive heart failure, renal disease, venous valve incompetence and iatrogenic factors.  He has been wearing compression, walking and is taking a diuretic but still has seen his swelling worsened.    His last lower extremity venous duplex ultrasound was some 4 years ago and I feel it would be prudent to obtain an up-to-date ultrasound to assess valve incompetence.  He will return in the near future.    BRITTANY Antonio MD

## 2018-04-02 NOTE — MR AVS SNAPSHOT
After Visit Summary   4/2/2018    Paulo Jeffers    MRN: [id number]           Patient Information     Date Of Birth          1935        Visit Information        Provider Department      4/2/2018 3:15 PM Denny Antonio MD [facility]      Today's Diagnoses     ERRONEOUS ENCOUNTER--DISREGARD    -  1       Follow-ups after your visit        Your next 10 appointments already scheduled     Apr 09, 2018 10:30 AM CDT   Ultrasound Bilaterally with [facility]   [facility] ([facility]    [address] [facility] [id number] [phone]            Apr 09, 2018  2:45 PM CDT   Ultrasound Results with Denny Antonio MD   [facility] ([facility])    [address] [facility] [id number] [phone]              Who to contact     If you have questions or need follow up information about today's clinic visit or your schedule please contact [facility] directly at [phone].  Normal or non-critical lab and imaging results will be communicated to you by [email or web address], letter or phone within 4 business days after the clinic has received the results. If you do not hear from us within 7 days, please contact the clinic through [email or web address] or phone. If you have a critical or abnormal lab result, we will notify you by phone as soon as possible.  Submit refill requests through [employer] or call your pharmacy and they will forward the refill request to us. Please allow 3 business days for your refill to be completed.          Additional Information About Your Visit        MyChart Information     [employer] gives you secure access to your electronic health record. If you see a primary care provider, you can also send messages to your care team and make appointments. If you have questions, please call your primary care  clinic.  If you do not have a primary care provider, please call [phone] and they will assist you.        Care EveryWhere ID     This is your Care EveryWhere ID. This could be used by other organizations to access your [place] medical records  [id number]         Blood Pressure from Last 3 Encounters:   03/21/17 143/82   03/16/17 125/76   03/16/17 116/73    Weight from Last 3 Encounters:   09/21/12 93 kg (205 lb)   06/30/12 93.9 kg (207 lb)   06/20/12 95.9 kg (211 lb 6.7 oz)              Today, you had the following     No orders found for display       Primary Care Provider Office Phone # Fax #    Denny Roque [phone]       [address] [address]        Equal Access to Services     [facility]: Hadii jesus gan hadasho Soitz, waaxda luqadaha, qaybta kaalmada adereaganyada, saba monte . So [facility] [phone].    ATENCIÓN: Si habla español, tiene a carr disposición servicios gratuitos de asistencia lingüística. Rah al [phone].    We comply with applicable federal civil rights laws and [place] laws. We do not discriminate on the basis of race, color, national origin, age, disability, sex, sexual orientation, or gender identity.            Thank you!     Thank you for choosing [facility]  for your care. Our goal is always to provide you with excellent care. Hearing back from our patients is one way we can continue to improve our services. Please take a few minutes to complete the written survey that you may receive in the mail after your visit with us. Thank you!             Your Updated Medication List - Protect others around you: Learn how to safely use, store and throw away your medicines at www.disposemymeds.org.          This list is accurate as of 4/2/18 11:59 PM.  Always use your most recent med list.                   Brand Name Dispense Instructions for use Diagnosis    ALDACTONE  PO           AMIODARONE HCL PO      Take 200 mg by mouth daily        cycloSPORINE 0.05 % ophthalmic emulsion    RESTASIS     1 drop 2 times daily        dutasteride 0.5 MG capsule    AVODART     Take 0.5 mg by mouth daily.        GABAPENTIN PO           LACTOBACILLUS PO           LOSARTAN POTASSIUM PO           methylcellulose 500 MG Tabs tablet    CITRUCEL     Take 500 mg by mouth daily        Multi-vitamin Tabs tablet      Take 1 tablet by mouth daily        NORVASC PO      Take 2.5 mg by mouth 2 times daily        omega-3 acid ethyl esters 1 G capsule    Lovaza     Take 2 g by mouth 2 times daily        OXYBUTYNIN CHLORIDE PO           timolol 0.5 % ophthalmic solution    TIMOPTIC     1 drop 2 times daily        XARELTO PO           ZIOPTAN 0.0015 % Soln   Generic drug:  Tafluprost      1 drop At Bedtime

## 2018-04-09 ENCOUNTER — OFFICE VISIT (OUTPATIENT)
Dept: VASCULAR SURGERY | Facility: CLINIC | Age: 83
End: 2018-04-09
Payer: COMMERCIAL

## 2018-04-09 ENCOUNTER — APPOINTMENT (OUTPATIENT)
Dept: VASCULAR SURGERY | Facility: CLINIC | Age: 83
End: 2018-04-09
Payer: COMMERCIAL

## 2018-04-09 DIAGNOSIS — Z53.9 ERRONEOUS ENCOUNTER--DISREGARD: Primary | ICD-10-CM

## 2018-04-09 PROCEDURE — 99213 OFFICE O/P EST LOW 20 MIN: CPT | Performed by: SURGERY

## 2018-04-09 PROCEDURE — 93970 EXTREMITY STUDY: CPT | Performed by: SURGERY

## 2018-04-09 NOTE — PROGRESS NOTES
[facility] Progress Note    Paulo Jeffers returns in follow-up of bilateral lower extremity venous insufficiency and lower extremity swelling.  Please see my dictation of  April 2, 2018 for details.  Mr. Noguera returned today for bilateral lower extremity venous duplex ultrasound.  He states that he has started furosemide over the last 4 days and that the swelling of his lower extremities has improved significantly.    Duplex ultrasound of his right lower extremity deep veins reveals no evidence of deep antibiosis.  His common femoral, mid to distal femoral and popliteal veins are incompetent.  His right great saphenous vein is competent as is the right small saphenous vein.  The anterior accessory saphenous vein and Vein of Giacomini are not visualized.  There is a small incompetent  12 cm from the right medial malleolus which communicate with the great saphenous vein.  In the left lower extremity is no evidence of deep vein thrombosis.  The common femoral and femoral veins are incompetent but the remaining deep vein valves are competent.  The left great saphenous vein, small saphenous vein, anterior accessory saphenous vein and Vein of Giacomini are competent.  There are no incompetent perforators appreciated.    Impression  The swelling of his lower extremities has improved significantly with furosemide therapy over the last 4 days.  He states he is taking his furosemide as he judges appropriate.  I suggest that he discuss this Dr. Denny Roque, his Internist.    I do not feel that his deep venous insufficiency is a significant contributor to his swelling and feel no intervention is indicated.  I suggest that he continue to wear his knee-high compression hose on a daily basis, continue to exercise and to control his weight.  I will see him on an as-needed basis.    BRITTANY Antonio MD    Please send a copy this to Dr. Denny Roque

## 2019-09-30 ENCOUNTER — HEALTH MAINTENANCE LETTER (OUTPATIENT)
Age: 84
End: 2019-09-30

## 2019-12-15 ENCOUNTER — HEALTH MAINTENANCE LETTER (OUTPATIENT)
Age: 84
End: 2019-12-15

## 2021-01-15 ENCOUNTER — HEALTH MAINTENANCE LETTER (OUTPATIENT)
Age: 86
End: 2021-01-15

## 2021-10-24 ENCOUNTER — HEALTH MAINTENANCE LETTER (OUTPATIENT)
Age: 86
End: 2021-10-24

## 2022-02-13 ENCOUNTER — HEALTH MAINTENANCE LETTER (OUTPATIENT)
Age: 87
End: 2022-02-13

## 2022-10-16 ENCOUNTER — HEALTH MAINTENANCE LETTER (OUTPATIENT)
Age: 87
End: 2022-10-16

## 2023-03-26 ENCOUNTER — HEALTH MAINTENANCE LETTER (OUTPATIENT)
Age: 88
End: 2023-03-26

## (undated) DEVICE — PEN MARKING SKIN

## (undated) DEVICE — SPECIMEN CONTAINER 60MLW/10% FORMALIN 59601